# Patient Record
Sex: FEMALE | Race: WHITE
[De-identification: names, ages, dates, MRNs, and addresses within clinical notes are randomized per-mention and may not be internally consistent; named-entity substitution may affect disease eponyms.]

---

## 2019-01-25 ENCOUNTER — HOSPITAL ENCOUNTER (OUTPATIENT)
Dept: HOSPITAL 89 - MRI | Age: 24
End: 2019-01-25
Attending: NURSE PRACTITIONER
Payer: COMMERCIAL

## 2019-01-25 DIAGNOSIS — M48.061: Primary | ICD-10-CM

## 2019-01-25 PROCEDURE — 72148 MRI LUMBAR SPINE W/O DYE: CPT

## 2019-01-25 NOTE — RADIOLOGY IMAGING REPORT
FACILITY: [facility] 

 

PATIENT NAME: Roxann Myers

: 1995

MR: [id number]

V: [id number]

EXAM DATE: 

ORDERING PHYSICIAN: JERICA RIDDLE

TECHNOLOGIST: 

 

[place] [facility] [place]

Patient: Roxann Myers

: 1995

MRN: [id number]

Visit/Account:[id number]

Date of Sevice:  2019

 

ACCESSION #: [id number]

 

EXAMINATION:

MRI lumbar spine without IV contrast

 

HISTORY:  Back pain.

 

COMPARISON:  None.

 

TECHNIQUE:  Multi-planar, multi-sequence lumbar spine MRI was performed without intravenous contrast 
administration.

 

FINDINGS:

 

Alignment: Normal.

Vertebral marrow signal: Negative.

Distal thoracic cord: Negative.

Conus: negative, terminates at L1-2.

Cauda equina: Negative.

Paravertebral soft tissues: Negative.

Visualized abdominal and pelvic structures: Negative.

 

Disc spaces:

 

Lower thoracic spine: Normal.

 

L1-2: Normal.

 

L2-3: Normal.

 

L3-4: Normal.

 

L4-5: Normal.

 

L5-S1: Disc desiccation with a broad-based posterior disc protrusion.  There is mild bilateral latera
l recess stenosis without significant central canal or foraminal stenosis.

 

IMPRESSION:

 

Disc protrusion at L5-S1 causes mild bilateral lateral recess stenosis.  No significant spinal stenos
is or nerve root compression.

 

Report Dictated By: Rhianna Bond MD at 2019 5:22 PM

 

Report E-Signed By: Rhianna Bond MD  at 2019 5:24 PM

 

WSN:[id number]